# Patient Record
Sex: FEMALE | Race: WHITE
[De-identification: names, ages, dates, MRNs, and addresses within clinical notes are randomized per-mention and may not be internally consistent; named-entity substitution may affect disease eponyms.]

---

## 2019-02-12 ENCOUNTER — HOSPITAL ENCOUNTER (EMERGENCY)
Dept: HOSPITAL 74 - JERFT | Age: 29
Discharge: HOME | End: 2019-02-12
Payer: SELF-PAY

## 2019-02-12 VITALS — SYSTOLIC BLOOD PRESSURE: 153 MMHG | TEMPERATURE: 100.5 F | HEART RATE: 110 BPM | DIASTOLIC BLOOD PRESSURE: 98 MMHG

## 2019-02-12 VITALS — BODY MASS INDEX: 22.6 KG/M2

## 2019-02-12 DIAGNOSIS — J09.X2: Primary | ICD-10-CM

## 2019-02-12 NOTE — PDOC
Rapid Medical Evaluation


Time Seen by Provider: 02/12/19 18:21


Medical Evaluation: 


 Allergies











Allergy/AdvReac Type Severity Reaction Status Date / Time


 


No Known Allergies Allergy   Verified 12/05/15 08:48











02/12/19 18:21


Pt presents to the ED for flu symptoms, headache, bodyaches, subjective fevers 

for 2 days


Exam: Tachycardic, NAD, fever 100.5


Orders: Flu swab, motrin


Pt to proceed to the ED for further evaluation





**Discharge Disposition





- Diagnosis


 Fever








- Referrals





- Patient Instructions





- Post Discharge Activity

## 2019-02-12 NOTE — PDOC
History of Present Illness





- General


Chief Complaint: Cold Symptoms


Stated Complaint: FLU SYMPTOMS


Time Seen by Provider: 02/12/19 18:21


History Source: Patient


Exam Limitations: No Limitations





- History of Present Illness


Initial Comments: 





02/12/19 19:12


Acute onset yesterday of fevers, body aches, sore throat pain, runny nose, ear, 

has been using over-the-counter medications with some moderate resolved. Works 

as [employer] but family is not ill


Timing/Duration: reports: getting worse


Severity: reports: moderate


Associated Symptoms: reports: chest pain/soreness, dizziness, fever/chills, 

muscle aches, nasal congestion, nasal drainage, sore throat, wheezing





Past History





- Travel


Traveled outside of the country in the last 30 days: No


Close contact w/someone who was outside of country & ill: No





- Past Medical History


Allergies/Adverse Reactions: 


 Allergies











Allergy/AdvReac Type Severity Reaction Status Date / Time


 


No Known Allergies Allergy   Verified 02/12/19 18:22











Home Medications: 


Ambulatory Orders





Guaifenesin [Mucinex -] 600 mg PO BID 02/12/19 


Oseltamivir Phosphate [Tamiflu -] 75 mg PO BID #10 capsule 02/12/19 








COPD: No


Other medical history: DENIES.





- Suicide/Smoking/Psychosocial Hx


Smoking History: Never smoked


Hx Alcohol Use: No


Drug/Substance Use Hx: No





**Review of Systems





- Review of Systems


Able to Perform ROS?: Yes


Is the patient limited English proficient: Yes


Constitutional: Yes: Symptoms Reported, See HPI, Fever, Loss of Appetite, 

Malaise


HEENTM: Yes: Symptoms Reported, See HPI, Nose Congestion, Throat Swelling, 

Difficulty Swallowing


Respiratory: Yes: Symptoms reported, See HPI, Cough, Wheezing


Musculoskeletal: Yes: Symptoms Reported, See HPI, Muscle Pain, Muscle Weakness


Integumentary: Yes: Symptoms Reported


Neurological: Yes: Symptoms reported, See HPI, Headache


All Other Systems: Reviewed and Negative





*Physical Exam





- Vital Signs


 Last Vital Signs











Temp Pulse Resp BP Pulse Ox


 


 100.5 F H  110 H  17   153/98   98 


 


 02/12/19 18:22  02/12/19 18:22  02/12/19 18:22  02/12/19 18:22  02/12/19 18:22














- Physical Exam


Comments: 





02/12/19 19:14





GENERAL: [The child is awake, alert, and appropriately interactive.]


EYES: [The pupils are equal, round, and reactive to light, with clear, 

conjunctiva.but glassy]


NOSE: [The nose with clear drainage


EARS: [The ear canals and tympanic membranes are congested but landmarks easily 

visualed ]


THROAT: [The oropharynx is clear with erythema, no exudates. The mucous 

membranes are moist.]


NECK: [The neck is supple with mildly tender adenopathy, no menigemous]


CHEST: [The lungs are coarse but clear without crackles, or wheezes.]


HEART: [Heart is regular rhythm, with normal S1 and S2, no murmurs.]


ABDOMEN: [The abdomen is soft and nontender with normal bowel sounds. There is 

no organomegaly and no mass. There is no guarding or rebound.]


EXTREMITIES: [Extremities are normal.]


NEURO: [Behavior is normal for age.cranky but easily,m  Tone is normal.]


SKIN: [Skin is unremarkable without rash or swelling. There is no bruising, and 

there are no other signs of injury.]


General Appearance: Yes: Nourished, Appropriately Dressed, Apparent Distress, 

Moderate Distress





Moderate Sedation





- Procedure Monitoring


Vital Signs: 


Procedure Monitoring Vital Signs











Temperature  100.5 F H  02/12/19 18:22


 


Pulse Rate  110 H  02/12/19 18:22


 


Respiratory Rate  17   02/12/19 18:22


 


Blood Pressure  153/98   02/12/19 18:22


 


O2 Sat by Pulse Oximetry (%)  98   02/12/19 18:22











ED Treatment Course





- Medications


Given in the ED: 


ED Medications














Discontinued Medications














Generic Name Dose Route Start Last Admin





  Trade Name Bryanq  PRN Reason Stop Dose Admin


 


Ibuprofen  600 mg  02/12/19 18:25  02/12/19 18:30





  Motrin -  PO  02/12/19 18:26  600 mg





  ONCE ONE   Administration





     





     





     





     














Progress Note





- Progress Note


Progress Note: 





Influenza A test positive, will treat with Tamiflu is within window and 

continue over-the-counter medications





*DC/Admit/Observation/Transfer


Diagnosis at time of Disposition: 


 Influenza A








- Discharge Dispostion


Disposition: HOME


Condition at time of disposition: Stable


Decision to Admit order: No





- Prescriptions


Prescriptions: 


Oseltamivir Phosphate [Tamiflu -] 75 mg PO BID #10 capsule





- Referrals





- Patient Instructions


Printed Discharge Instructions:  DI for Influenza -- Adult


Additional Instructions: 


Rest, drink lots of fluids: Teas, water, soups, Pedialyte


Saltwater gargles


Steamy showers/seem to face break up mucus


Old-fashioned treatments help!


Avoid contact with others until fevers and cough resolved as this is very 

contagious


Lots of handwashing and good hygiene





Continue over-the-counter medications for symptomatic relief


Tylenol or Motrin for fever and pain


Take all of Tamiflu as directed: 1 tab every 12 hours for 5 days





Followup with private physician in one to 2 days as needed or if worsening


Return to emergency department for worsened symptoms, fevers, dehydration


Influenza takes between 5 and 7 days for resolution


To not participate in any activity, work, or school until fevers and cough are 

gone for at least one day





- Post Discharge Activity


Forms/Work/School Notes:  Back to Work

## 2021-03-09 ENCOUNTER — HOSPITAL ENCOUNTER (EMERGENCY)
Dept: HOSPITAL 74 - JER | Age: 31
Discharge: HOME | End: 2021-03-09
Payer: COMMERCIAL

## 2021-03-09 VITALS — TEMPERATURE: 98.6 F

## 2021-03-09 VITALS — HEART RATE: 74 BPM | DIASTOLIC BLOOD PRESSURE: 68 MMHG | SYSTOLIC BLOOD PRESSURE: 112 MMHG

## 2021-03-09 VITALS — BODY MASS INDEX: 22.6 KG/M2

## 2021-03-09 DIAGNOSIS — R11.2: Primary | ICD-10-CM

## 2021-03-09 DIAGNOSIS — K29.70: ICD-10-CM

## 2021-03-09 LAB
ALBUMIN SERPL-MCNC: 3.9 G/DL (ref 3.4–5)
ALP SERPL-CCNC: 72 U/L (ref 45–117)
ALT SERPL-CCNC: 61 U/L (ref 13–61)
ANION GAP SERPL CALC-SCNC: 4 MMOL/L (ref 8–16)
APPEARANCE UR: CLEAR
AST SERPL-CCNC: 32 U/L (ref 15–37)
BACTERIA # UR AUTO: 2676 /UL (ref 0–1359)
BASOPHILS # BLD: 0.2 % (ref 0–2)
BILIRUB SERPL-MCNC: 0.5 MG/DL (ref 0.2–1)
BILIRUB UR STRIP.AUTO-MCNC: NEGATIVE MG/DL
BUN SERPL-MCNC: 11.6 MG/DL (ref 7–18)
CALCIUM SERPL-MCNC: 8.9 MG/DL (ref 8.5–10.1)
CASTS URNS QL MICRO: 1 /UL (ref 0–3.1)
CHLORIDE SERPL-SCNC: 107 MMOL/L (ref 98–107)
CO2 SERPL-SCNC: 28 MMOL/L (ref 21–32)
COLOR UR: YELLOW
CREAT SERPL-MCNC: 0.6 MG/DL (ref 0.55–1.3)
DEPRECATED RDW RBC AUTO: 13.1 % (ref 11.6–15.6)
EOSINOPHIL # BLD: 0.2 % (ref 0–4.5)
EPITH CASTS URNS QL MICRO: 23 /UL (ref 0–25.1)
GLUCOSE SERPL-MCNC: 80 MG/DL (ref 74–106)
HCT VFR BLD CALC: 37.8 % (ref 32.4–45.2)
HGB BLD-MCNC: 13.2 GM/DL (ref 10.7–15.3)
KETONES UR QL STRIP: (no result)
LEUKOCYTE ESTERASE UR QL STRIP.AUTO: NEGATIVE
LIPASE SERPL-CCNC: 104 U/L (ref 73–393)
LYMPHOCYTES # BLD: 13.3 % (ref 8–40)
MAGNESIUM SERPL-MCNC: 2.3 MG/DL (ref 1.8–2.4)
MCH RBC QN AUTO: 30.1 PG (ref 25.7–33.7)
MCHC RBC AUTO-ENTMCNC: 34.9 G/DL (ref 32–36)
MCV RBC: 86.3 FL (ref 80–96)
MONOCYTES # BLD AUTO: 4.8 % (ref 3.8–10.2)
NEUTROPHILS # BLD: 81.5 % (ref 42.8–82.8)
NITRITE UR QL STRIP: NEGATIVE
PH UR: 6.5 [PH] (ref 5–8)
PLATELET # BLD AUTO: 415 K/MM3 (ref 134–434)
PMV BLD: 7.3 FL (ref 7.5–11.1)
PROT SERPL-MCNC: 7.5 G/DL (ref 6.4–8.2)
PROT UR QL STRIP: (no result)
PROT UR QL STRIP: NEGATIVE
RBC # BLD AUTO: 27 /UL (ref 0–23.9)
RBC # BLD AUTO: 4.38 M/MM3 (ref 3.6–5.2)
SODIUM SERPL-SCNC: 140 MMOL/L (ref 136–145)
SP GR UR: 1.02 (ref 1.01–1.03)
UROBILINOGEN UR STRIP-MCNC: 0.2 MG/DL (ref 0.2–1)
WBC # BLD AUTO: 8.9 K/MM3 (ref 4–10)
WBC # UR AUTO: 8 /UL (ref 0–25.8)

## 2021-03-09 PROCEDURE — 3E033GC INTRODUCTION OF OTHER THERAPEUTIC SUBSTANCE INTO PERIPHERAL VEIN, PERCUTANEOUS APPROACH: ICD-10-PCS | Performed by: NURSE PRACTITIONER

## 2021-03-09 PROCEDURE — 3E0333Z INTRODUCTION OF ANTI-INFLAMMATORY INTO PERIPHERAL VEIN, PERCUTANEOUS APPROACH: ICD-10-PCS | Performed by: NURSE PRACTITIONER

## 2021-03-09 PROCEDURE — 3E0337Z INTRODUCTION OF ELECTROLYTIC AND WATER BALANCE SUBSTANCE INTO PERIPHERAL VEIN, PERCUTANEOUS APPROACH: ICD-10-PCS | Performed by: NURSE PRACTITIONER
